# Patient Record
Sex: FEMALE | Race: WHITE | NOT HISPANIC OR LATINO | Employment: OTHER | URBAN - METROPOLITAN AREA
[De-identification: names, ages, dates, MRNs, and addresses within clinical notes are randomized per-mention and may not be internally consistent; named-entity substitution may affect disease eponyms.]

---

## 2023-08-07 LAB
ALANINE AMINOTRANSFERASE (SGPT) (U/L) IN SER/PLAS: 16 U/L (ref 7–45)
ALBUMIN (G/DL) IN SER/PLAS: 4.4 G/DL (ref 3.4–5)
ALKALINE PHOSPHATASE (U/L) IN SER/PLAS: 72 U/L (ref 33–136)
ANION GAP IN SER/PLAS: 10 MMOL/L (ref 10–20)
ASPARTATE AMINOTRANSFERASE (SGOT) (U/L) IN SER/PLAS: 14 U/L (ref 9–39)
BILIRUBIN TOTAL (MG/DL) IN SER/PLAS: 0.5 MG/DL (ref 0–1.2)
CALCIUM (MG/DL) IN SER/PLAS: 8.8 MG/DL (ref 8.6–10.3)
CARBON DIOXIDE, TOTAL (MMOL/L) IN SER/PLAS: 20 MMOL/L (ref 21–32)
CHLORIDE (MMOL/L) IN SER/PLAS: 113 MMOL/L (ref 98–107)
CHOLESTEROL (MG/DL) IN SER/PLAS: 194 MG/DL (ref 0–199)
CHOLESTEROL IN HDL (MG/DL) IN SER/PLAS: 43.5 MG/DL
CHOLESTEROL/HDL RATIO: 4.5
CREATININE (MG/DL) IN SER/PLAS: 0.93 MG/DL (ref 0.5–1.05)
ERYTHROCYTE DISTRIBUTION WIDTH (RATIO) BY AUTOMATED COUNT: 13.3 % (ref 11.5–14.5)
ERYTHROCYTE MEAN CORPUSCULAR HEMOGLOBIN CONCENTRATION (G/DL) BY AUTOMATED: 30.6 G/DL (ref 32–36)
ERYTHROCYTE MEAN CORPUSCULAR VOLUME (FL) BY AUTOMATED COUNT: 92 FL (ref 80–100)
ERYTHROCYTES (10*6/UL) IN BLOOD BY AUTOMATED COUNT: 4.77 X10E12/L (ref 4–5.2)
GFR FEMALE: 68 ML/MIN/1.73M2
GLUCOSE (MG/DL) IN SER/PLAS: 93 MG/DL (ref 74–99)
HEMATOCRIT (%) IN BLOOD BY AUTOMATED COUNT: 44.1 % (ref 36–46)
HEMOGLOBIN (G/DL) IN BLOOD: 13.5 G/DL (ref 12–16)
LDL: 136 MG/DL (ref 0–99)
LEUKOCYTES (10*3/UL) IN BLOOD BY AUTOMATED COUNT: 6.1 X10E9/L (ref 4.4–11.3)
NRBC (PER 100 WBCS) BY AUTOMATED COUNT: 0 /100 WBC (ref 0–0)
PLATELETS (10*3/UL) IN BLOOD AUTOMATED COUNT: 279 X10E9/L (ref 150–450)
POTASSIUM (MMOL/L) IN SER/PLAS: 4.3 MMOL/L (ref 3.5–5.3)
PROTEIN TOTAL: 7.4 G/DL (ref 6.4–8.2)
SODIUM (MMOL/L) IN SER/PLAS: 139 MMOL/L (ref 136–145)
TRIGLYCERIDE (MG/DL) IN SER/PLAS: 71 MG/DL (ref 0–149)
UREA NITROGEN (MG/DL) IN SER/PLAS: 17 MG/DL (ref 6–23)
VLDL: 14 MG/DL (ref 0–40)

## 2023-11-09 ENCOUNTER — HOSPITAL ENCOUNTER (OUTPATIENT)
Dept: RADIOLOGY | Facility: CLINIC | Age: 68
Discharge: HOME | End: 2023-11-09
Payer: MEDICARE

## 2023-11-09 DIAGNOSIS — R91.8 OTHER NONSPECIFIC ABNORMAL FINDING OF LUNG FIELD: ICD-10-CM

## 2023-11-09 PROCEDURE — 71250 CT THORAX DX C-: CPT | Performed by: RADIOLOGY

## 2023-11-09 PROCEDURE — 71250 CT THORAX DX C-: CPT

## 2023-11-12 PROBLEM — M17.11 OSTEOARTHRITIS OF RIGHT KNEE: Status: ACTIVE | Noted: 2023-11-12

## 2023-11-12 PROBLEM — I10 HTN (HYPERTENSION): Status: ACTIVE | Noted: 2023-11-12

## 2023-11-12 PROBLEM — K21.9 GERD (GASTROESOPHAGEAL REFLUX DISEASE): Status: ACTIVE | Noted: 2018-04-16

## 2023-11-12 PROBLEM — R06.09 EXERTIONAL DYSPNEA: Status: ACTIVE | Noted: 2023-11-12

## 2023-11-12 PROBLEM — R91.8 MULTIPLE LUNG NODULES ON CT: Status: ACTIVE | Noted: 2023-11-12

## 2023-11-12 PROBLEM — M50.90 CERVICAL NECK PAIN WITH EVIDENCE OF DISC DISEASE: Status: ACTIVE | Noted: 2023-11-12

## 2023-11-12 PROBLEM — M19.90 ARTHRITIS: Status: ACTIVE | Noted: 2023-11-12

## 2023-11-12 PROBLEM — M19.90 OA (OSTEOARTHRITIS): Status: ACTIVE | Noted: 2018-04-16

## 2023-11-12 PROBLEM — E66.9 OBESITY: Status: ACTIVE | Noted: 2023-11-12

## 2023-11-12 PROBLEM — R07.89 CHEST PAIN, ATYPICAL: Status: ACTIVE | Noted: 2023-11-12

## 2023-11-12 PROBLEM — M25.532 PAIN IN LEFT WRIST: Status: ACTIVE | Noted: 2017-05-23

## 2023-11-12 PROBLEM — Z86.2 HISTORY OF IMMUNE THROMBOCYTOPENIA: Status: ACTIVE | Noted: 2023-11-12

## 2023-11-12 PROBLEM — R94.31 ABNORMAL ELECTROCARDIOGRAPHY: Status: ACTIVE | Noted: 2023-11-12

## 2023-11-12 PROBLEM — I10 BENIGN ESSENTIAL HYPERTENSION: Status: ACTIVE | Noted: 2018-04-16

## 2023-11-12 PROBLEM — E78.5 HYPERLIPIDEMIA: Status: ACTIVE | Noted: 2023-11-12

## 2023-11-12 PROBLEM — M79.646 THUMB PAIN: Status: ACTIVE | Noted: 2017-05-23

## 2023-11-12 PROBLEM — R91.1 LUNG NODULE: Status: ACTIVE | Noted: 2023-11-12

## 2023-11-12 PROBLEM — Z96.659 S/P TOTAL KNEE REPLACEMENT: Status: ACTIVE | Noted: 2018-04-16

## 2023-11-12 PROBLEM — C53.9: Status: ACTIVE | Noted: 2023-11-12

## 2023-11-12 RX ORDER — FLUTICASONE PROPIONATE 0.5 MG/G
CREAM TOPICAL 2 TIMES DAILY
COMMUNITY
Start: 2023-05-15

## 2023-11-12 RX ORDER — AMMONIUM LACTATE 12 G/100G
LOTION TOPICAL DAILY
COMMUNITY
Start: 2022-11-14 | End: 2023-11-30

## 2023-11-12 RX ORDER — THYROID 15 MG/1
15 TABLET ORAL
COMMUNITY
Start: 2018-08-20 | End: 2023-11-30

## 2023-11-12 RX ORDER — THYROID 60 MG/1
1 TABLET ORAL DAILY
COMMUNITY
End: 2023-11-30

## 2023-11-12 RX ORDER — NYSTATIN 100000 [USP'U]/G
1 POWDER TOPICAL EVERY MORNING
COMMUNITY
Start: 2023-10-18 | End: 2023-11-30

## 2023-11-12 RX ORDER — THYROID 30 MG/1
75 TABLET ORAL
COMMUNITY
Start: 2018-03-27 | End: 2023-11-30

## 2023-11-12 RX ORDER — METRONIDAZOLE 7.5 MG/G
GEL TOPICAL DAILY
COMMUNITY
Start: 2022-11-14 | End: 2023-11-30

## 2023-11-12 RX ORDER — TELMISARTAN 80 MG/1
80 TABLET ORAL
COMMUNITY

## 2023-11-12 RX ORDER — CALCIUM CARBONATE/VITAMIN D3 600 MG-10
1 TABLET ORAL DAILY
COMMUNITY
End: 2023-11-30

## 2023-11-12 RX ORDER — ACETAZOLAMIDE 250 MG/1
250 TABLET ORAL 3 TIMES DAILY
COMMUNITY
End: 2023-11-30

## 2023-11-12 RX ORDER — ACETAZOLAMIDE 250 MG/1
375 TABLET ORAL 3 TIMES DAILY
COMMUNITY
Start: 2023-07-12

## 2023-11-12 RX ORDER — METRONIDAZOLE 10 MG/G
GEL TOPICAL DAILY
COMMUNITY
Start: 2023-05-15

## 2023-11-12 RX ORDER — TELMISARTAN 80 MG/1
40 TABLET ORAL DAILY
COMMUNITY
End: 2023-11-30

## 2023-11-12 RX ORDER — HYDROCORTISONE 25 MG/G
CREAM TOPICAL 2 TIMES DAILY
COMMUNITY
Start: 2021-06-17 | End: 2023-11-30

## 2023-11-12 RX ORDER — THYROID, PORCINE 90 MG/1
90 TABLET ORAL
COMMUNITY

## 2023-11-12 RX ORDER — DEXLANSOPRAZOLE 60 MG/1
60 CAPSULE, DELAYED RELEASE ORAL DAILY
COMMUNITY
Start: 2018-04-16 | End: 2023-11-30

## 2023-11-12 RX ORDER — ASPIRIN 325 MG
325 TABLET, DELAYED RELEASE (ENTERIC COATED) ORAL
COMMUNITY
Start: 2018-04-17 | End: 2023-11-30

## 2023-11-12 RX ORDER — FUROSEMIDE 40 MG/1
60 TABLET ORAL DAILY PRN
COMMUNITY
Start: 2020-09-02 | End: 2023-11-30

## 2023-11-14 ENCOUNTER — APPOINTMENT (OUTPATIENT)
Dept: PRIMARY CARE | Facility: CLINIC | Age: 68
End: 2023-11-14
Payer: MEDICARE

## 2023-11-28 ENCOUNTER — OFFICE VISIT (OUTPATIENT)
Dept: PRIMARY CARE | Facility: CLINIC | Age: 68
End: 2023-11-28
Payer: MEDICARE

## 2023-11-28 VITALS — HEIGHT: 68 IN | BODY MASS INDEX: 28.74 KG/M2

## 2023-11-28 DIAGNOSIS — R91.8 LUNG NODULE, MULTIPLE: Primary | ICD-10-CM

## 2023-11-28 PROCEDURE — 99212 OFFICE O/P EST SF 10 MIN: CPT | Mod: ZK | Performed by: NURSE PRACTITIONER

## 2023-11-28 PROCEDURE — 1159F MED LIST DOCD IN RCRD: CPT | Performed by: NURSE PRACTITIONER

## 2023-11-28 PROCEDURE — 99213 OFFICE O/P EST LOW 20 MIN: CPT | Performed by: NURSE PRACTITIONER

## 2023-11-28 ASSESSMENT — PATIENT HEALTH QUESTIONNAIRE - PHQ9
1. LITTLE INTEREST OR PLEASURE IN DOING THINGS: NOT AT ALL
SUM OF ALL RESPONSES TO PHQ9 QUESTIONS 1 AND 2: 0
2. FEELING DOWN, DEPRESSED OR HOPELESS: NOT AT ALL

## 2023-11-28 ASSESSMENT — COLUMBIA-SUICIDE SEVERITY RATING SCALE - C-SSRS
2. HAVE YOU ACTUALLY HAD ANY THOUGHTS OF KILLING YOURSELF?: NO
1. IN THE PAST MONTH, HAVE YOU WISHED YOU WERE DEAD OR WISHED YOU COULD GO TO SLEEP AND NOT WAKE UP?: NO
6. HAVE YOU EVER DONE ANYTHING, STARTED TO DO ANYTHING, OR PREPARED TO DO ANYTHING TO END YOUR LIFE?: NO

## 2023-11-28 ASSESSMENT — ENCOUNTER SYMPTOMS
OCCASIONAL FEELINGS OF UNSTEADINESS: 0
LOSS OF SENSATION IN FEET: 0
DEPRESSION: 0

## 2023-11-28 NOTE — PATIENT INSTRUCTIONS
Lung nodule surveillance  2 micro nodules considered benign. Otherwise resolution of additional previous nodules as described.  Area of subpleural reticulation at the lingula, probably inflammatory such as bronchiolitis.  Recommend CT Chest in 6 months.   She will be notified of results as they become available.

## 2023-11-28 NOTE — PROGRESS NOTES
"Subjective   Patient ID: Apryl Kim \"Su" is a 68 y.o. female who presents for lung nodule clinic   HPI 68-year-old female presents today for lung nodule clinic.    Former smoker.  Quit in 1995. Smoked for 10 years one pack per week. Mother and father both with skin cancer.    History of adenocarcinoma of the cervix     CT chest dated 11/9/2023   A micronodule in the left lung on image 152 of series 5 and right  lung on image 155 are stable and considered benign. Additional  nodularity as described on the previous examination have resolved,  indicating that were probably inflammatory including intrapulmonary  nodes. There is an approximate 3 cm area of subpleural reticulation  at the lingula laterally not evident on the prior examination. This  is probably inflammatory such as bronchiolitis or possibly due to  interval fibrosis. The lungs otherwise are clear.    CT chest dated 11/2/2022 with comparison to 11/16/2021  There is a 2 mm nodule in the left upper lobe in Image 133. there is  a 2 mm nodule in the left upper lobe along the fissure in image 159.  There is a 2 mm nodule in the left lower lobe in image 169.     There is a focal ground-glass opacity in the medial aspect of the  right lower lobe best seen on image 229. A 3 mm nodule is identified  in the right lower lobe in image 118. A few 1-2 mm nodules are  identified in the right lower lobe as well such as on image 176 and  181. There is a 1-2 mm nodule in the right middle lobe in image 160  corresponding to previously seen nodule measuring 4 mm which has  decreased in size.     CT cardiac score dated 11/16/2021  An approximate 4 mm right lung nodule on image 31 of series 203 is  probably an intrapulmonary node, perhaps granuloma.     Review of Systems  Review of systems: Present-feeling well. Not present-chills, fatigue and fever.  Respiratory: Not present-difficulty breathing, cough, bloody sputum.  Cardiovascular: Not present-chest pain, " palpitations, dyspnea on exertion.  Objective   There were no vitals taken for this visit.     Physical Exam  Gen.: Mental status-alert. Gen. appearance-cooperative, well groomed and consistent with stated age. Not in acute distress or sickly. Orientation-oriented to time, place, purpose and person. Build and nutrition-well-nourished and well-developed. Hydration-well-hydrated.    Integumentary: Color-normal coloration skin. Skin moisture-normal skin moisture.    Chest and lung exam: Auscultation-normal breath sounds, no adventitious lung sounds and normal vocal resonance. Chest wall is normal in shape and non-tender.    Cardiovascular: Auscultation: Regular rate and rhythm. Heart sounds-normal heart sounds, S1-S2. No murmurs or gallops appreciated. Carotid arteries normal and without bruit.  Assessment/Plan   Diagnoses and all orders for this visit:  Lung nodule, multiple  -     CT chest wo IV contrast; Future

## 2023-12-15 ENCOUNTER — APPOINTMENT (OUTPATIENT)
Dept: RADIOLOGY | Facility: CLINIC | Age: 68
End: 2023-12-15
Payer: MEDICARE

## 2023-12-20 ENCOUNTER — HOSPITAL ENCOUNTER (OUTPATIENT)
Dept: RADIOLOGY | Facility: HOSPITAL | Age: 68
Discharge: HOME | End: 2023-12-20
Payer: MEDICARE

## 2023-12-20 DIAGNOSIS — M25.562 PAIN IN LEFT KNEE: ICD-10-CM

## 2023-12-20 PROCEDURE — 73562 X-RAY EXAM OF KNEE 3: CPT | Mod: LT

## 2023-12-20 PROCEDURE — 73562 X-RAY EXAM OF KNEE 3: CPT | Mod: LEFT SIDE | Performed by: RADIOLOGY

## 2023-12-28 ENCOUNTER — APPOINTMENT (OUTPATIENT)
Dept: RADIOLOGY | Facility: CLINIC | Age: 68
End: 2023-12-28
Payer: MEDICARE

## 2024-05-29 ENCOUNTER — HOSPITAL ENCOUNTER (OUTPATIENT)
Dept: RADIOLOGY | Facility: CLINIC | Age: 69
Discharge: HOME | End: 2024-05-29
Payer: MEDICARE

## 2024-05-29 DIAGNOSIS — R91.8 LUNG NODULE, MULTIPLE: ICD-10-CM

## 2024-05-29 PROCEDURE — 71250 CT THORAX DX C-: CPT

## 2024-05-29 PROCEDURE — 71250 CT THORAX DX C-: CPT | Performed by: RADIOLOGY

## 2024-06-04 ENCOUNTER — OFFICE VISIT (OUTPATIENT)
Dept: PRIMARY CARE | Facility: CLINIC | Age: 69
End: 2024-06-04
Payer: MEDICARE

## 2024-06-04 VITALS
TEMPERATURE: 98.3 F | HEIGHT: 67 IN | HEART RATE: 67 BPM | SYSTOLIC BLOOD PRESSURE: 132 MMHG | DIASTOLIC BLOOD PRESSURE: 70 MMHG | BODY MASS INDEX: 29.6 KG/M2

## 2024-06-04 DIAGNOSIS — R91.8 LUNG NODULE, MULTIPLE: Primary | ICD-10-CM

## 2024-06-04 PROCEDURE — 99212 OFFICE O/P EST SF 10 MIN: CPT | Performed by: NURSE PRACTITIONER

## 2024-06-04 SDOH — ECONOMIC STABILITY: FOOD INSECURITY: WITHIN THE PAST 12 MONTHS, THE FOOD YOU BOUGHT JUST DIDN'T LAST AND YOU DIDN'T HAVE MONEY TO GET MORE.: PATIENT DECLINED

## 2024-06-04 SDOH — ECONOMIC STABILITY: FOOD INSECURITY: WITHIN THE PAST 12 MONTHS, YOU WORRIED THAT YOUR FOOD WOULD RUN OUT BEFORE YOU GOT MONEY TO BUY MORE.: PATIENT DECLINED

## 2024-06-04 ASSESSMENT — LIFESTYLE VARIABLES
AUDIT-C TOTAL SCORE: -1
HOW MANY STANDARD DRINKS CONTAINING ALCOHOL DO YOU HAVE ON A TYPICAL DAY: PATIENT DECLINED
SKIP TO QUESTIONS 9-10: 0
HOW OFTEN DO YOU HAVE SIX OR MORE DRINKS ON ONE OCCASION: PATIENT DECLINED
HOW OFTEN DO YOU HAVE A DRINK CONTAINING ALCOHOL: PATIENT DECLINED

## 2024-06-04 ASSESSMENT — PAIN SCALES - GENERAL: PAINLEVEL: 0-NO PAIN

## 2024-06-04 ASSESSMENT — SOCIAL DETERMINANTS OF HEALTH (SDOH): HOW HARD IS IT FOR YOU TO PAY FOR THE VERY BASICS LIKE FOOD, HOUSING, MEDICAL CARE, AND HEATING?: PATIENT DECLINED

## 2024-06-04 NOTE — PATIENT INSTRUCTIONS
No suspicious nodules or infiltrates.  No further action required at this time per Fleischner guidelines.

## 2024-06-04 NOTE — PROGRESS NOTES
"Subjective   Patient ID: Apryl Kim \"Su" is a 69 y.o. female who presents for Lung Nodule CT follow.  HPI 72-year-old female presents today for lung nodule clinic.    Former smoker.  Quit in 1991. Smoked for 10 years one pack per week. Mother and father both with skin cancer.    History of adenocarcinoma of the cervix     CT Chest dated 5/29/2024  Lungs are hyperinflated. No suspicious lung nodules or infiltrates..      CT chest dated 11/9/2023   A micronodule in the left lung on image 152 of series 5 and right  lung on image 155 are stable and considered benign. Additional  nodularity as described on the previous examination have resolved,  indicating that were probably inflammatory including intrapulmonary  nodes. There is an approximate 3 cm area of subpleural reticulation  at the lingula laterally not evident on the prior examination. This  is probably inflammatory such as bronchiolitis or possibly due to  interval fibrosis. The lungs otherwise are clear.     CT chest dated 11/2/2022 with comparison to 11/16/2021  There is a 2 mm nodule in the left upper lobe in Image 133. there is  a 2 mm nodule in the left upper lobe along the fissure in image 159.  There is a 2 mm nodule in the left lower lobe in image 169.     There is a focal ground-glass opacity in the medial aspect of the  right lower lobe best seen on image 229. A 3 mm nodule is identified  in the right lower lobe in image 118. A few 1-2 mm nodules are  identified in the right lower lobe as well such as on image 176 and  181. There is a 1-2 mm nodule in the right middle lobe in image 160  corresponding to previously seen nodule measuring 4 mm which has  decreased in size.     CT cardiac score dated 11/16/2021  An approximate 4 mm right lung nodule on image 31 of series 203 is  probably an intrapulmonary node, perhaps granuloma.     Review of Systems  Review of systems: Present-feeling well. Not present-chills, fatigue and fever.  Respiratory: " "Not present-difficulty breathing, cough, bloody sputum.  Cardiovascular: Not present-chest pain, palpitations, dyspnea on exertion.  Objective   /70 (BP Location: Left arm)   Pulse 67   Temp 36.8 °C (98.3 °F) (Temporal)   Ht 1.702 m (5' 7\")   BMI 29.60 kg/m²      Physical Exam  Gen.: Mental status-alert. Gen. appearance-cooperative, well groomed and consistent with stated age. Not in acute distress or sickly. Orientation-oriented to time, place, purpose and person. Build and nutrition-well-nourished and well-developed. Hydration-well-hydrated.    Integumentary: General characteristics-overall examination the patient´s skin reveals-no suspicious lesions, no bruises and no evidence of scars. Color-normal coloration skin. Skin moisture-normal skin moisture.    Chest and lung exam: Auscultation-normal breath sounds, no adventitious lung sounds and normal vocal resonance. Chest wall is normal in shape and non-tender.    Cardiovascular: Auscultation: Regular rate and rhythm. Heart sounds-normal heart sounds, S1-S2. No murmurs or gallops appreciated. Carotid arteries normal and without bruit.  Assessment/Plan           "

## 2024-07-02 DIAGNOSIS — I10 BENIGN ESSENTIAL HYPERTENSION: ICD-10-CM

## 2024-07-02 PROBLEM — Z86.2 HISTORY OF ANEMIA: Status: ACTIVE | Noted: 2024-07-02

## 2024-07-02 PROBLEM — R23.2 FLUSHING: Status: ACTIVE | Noted: 2024-07-02

## 2024-07-02 PROBLEM — M25.50 ARTHRALGIA: Status: ACTIVE | Noted: 2024-07-02

## 2024-07-02 PROBLEM — N39.3 STRESS INCONTINENCE OF URINE: Status: ACTIVE | Noted: 2024-07-02

## 2024-07-02 PROBLEM — C53.9: Status: RESOLVED | Noted: 2023-11-12 | Resolved: 2024-07-02

## 2024-07-02 PROBLEM — R39.15 URINARY URGENCY: Status: ACTIVE | Noted: 2024-07-02

## 2024-07-22 ENCOUNTER — APPOINTMENT (OUTPATIENT)
Dept: CARDIOLOGY | Facility: CLINIC | Age: 69
End: 2024-07-22
Payer: MEDICARE

## 2024-07-30 ENCOUNTER — APPOINTMENT (OUTPATIENT)
Dept: RADIOLOGY | Facility: CLINIC | Age: 69
End: 2024-07-30
Payer: MEDICARE

## 2024-08-14 ENCOUNTER — APPOINTMENT (OUTPATIENT)
Dept: CARDIOLOGY | Facility: CLINIC | Age: 69
End: 2024-08-14
Payer: MEDICARE

## 2024-08-19 ENCOUNTER — HOSPITAL ENCOUNTER (OUTPATIENT)
Dept: RADIOLOGY | Facility: CLINIC | Age: 69
Discharge: HOME | End: 2024-08-19
Payer: MEDICARE

## 2024-08-19 VITALS — BODY MASS INDEX: 39.4 KG/M2 | WEIGHT: 260 LBS | HEIGHT: 68 IN

## 2024-08-19 DIAGNOSIS — Z12.31 ENCOUNTER FOR SCREENING MAMMOGRAM FOR MALIGNANT NEOPLASM OF BREAST: ICD-10-CM

## 2024-08-19 PROCEDURE — 77063 BREAST TOMOSYNTHESIS BI: CPT | Performed by: RADIOLOGY

## 2024-08-19 PROCEDURE — 77067 SCR MAMMO BI INCL CAD: CPT | Performed by: RADIOLOGY

## 2024-08-19 PROCEDURE — 77067 SCR MAMMO BI INCL CAD: CPT

## 2024-08-29 ENCOUNTER — LAB (OUTPATIENT)
Dept: LAB | Facility: LAB | Age: 69
End: 2024-08-29
Payer: MEDICARE

## 2024-08-29 DIAGNOSIS — I10 BENIGN ESSENTIAL HYPERTENSION: ICD-10-CM

## 2024-08-29 LAB
ALBUMIN SERPL BCP-MCNC: 4.3 G/DL (ref 3.4–5)
ALP SERPL-CCNC: 75 U/L (ref 33–136)
ALT SERPL W P-5'-P-CCNC: 16 U/L (ref 7–45)
ANION GAP SERPL CALC-SCNC: 9 MMOL/L (ref 10–20)
AST SERPL W P-5'-P-CCNC: 14 U/L (ref 9–39)
BILIRUB SERPL-MCNC: 0.7 MG/DL (ref 0–1.2)
BUN SERPL-MCNC: 15 MG/DL (ref 6–23)
CALCIUM SERPL-MCNC: 8.9 MG/DL (ref 8.6–10.3)
CHLORIDE SERPL-SCNC: 112 MMOL/L (ref 98–107)
CHOLEST SERPL-MCNC: 216 MG/DL (ref 0–199)
CHOLESTEROL/HDL RATIO: 4
CO2 SERPL-SCNC: 23 MMOL/L (ref 21–32)
CREAT SERPL-MCNC: 0.91 MG/DL (ref 0.5–1.05)
EGFRCR SERPLBLD CKD-EPI 2021: 69 ML/MIN/1.73M*2
ERYTHROCYTE [DISTWIDTH] IN BLOOD BY AUTOMATED COUNT: 13.7 % (ref 11.5–14.5)
GLUCOSE SERPL-MCNC: 104 MG/DL (ref 74–99)
HCT VFR BLD AUTO: 43.3 % (ref 36–46)
HDLC SERPL-MCNC: 53.7 MG/DL
HGB BLD-MCNC: 13.5 G/DL (ref 12–16)
LDLC SERPL CALC-MCNC: 148 MG/DL
MCH RBC QN AUTO: 28.8 PG (ref 26–34)
MCHC RBC AUTO-ENTMCNC: 31.2 G/DL (ref 32–36)
MCV RBC AUTO: 92 FL (ref 80–100)
NON HDL CHOLESTEROL: 162 MG/DL (ref 0–149)
NRBC BLD-RTO: 0 /100 WBCS (ref 0–0)
PLATELET # BLD AUTO: 284 X10*3/UL (ref 150–450)
POTASSIUM SERPL-SCNC: 4.7 MMOL/L (ref 3.5–5.3)
PROT SERPL-MCNC: 7 G/DL (ref 6.4–8.2)
RBC # BLD AUTO: 4.69 X10*6/UL (ref 4–5.2)
SODIUM SERPL-SCNC: 139 MMOL/L (ref 136–145)
TRIGL SERPL-MCNC: 71 MG/DL (ref 0–149)
VLDL: 14 MG/DL (ref 0–40)
WBC # BLD AUTO: 5.8 X10*3/UL (ref 4.4–11.3)

## 2024-08-29 PROCEDURE — 80053 COMPREHEN METABOLIC PANEL: CPT

## 2024-08-29 PROCEDURE — 36415 COLL VENOUS BLD VENIPUNCTURE: CPT

## 2024-08-29 PROCEDURE — 85027 COMPLETE CBC AUTOMATED: CPT

## 2024-08-29 PROCEDURE — 80061 LIPID PANEL: CPT

## 2024-09-04 ENCOUNTER — APPOINTMENT (OUTPATIENT)
Dept: CARDIOLOGY | Facility: CLINIC | Age: 69
End: 2024-09-04
Payer: MEDICARE

## 2024-09-04 VITALS
HEART RATE: 60 BPM | HEIGHT: 68 IN | SYSTOLIC BLOOD PRESSURE: 140 MMHG | OXYGEN SATURATION: 97 % | BODY MASS INDEX: 39.53 KG/M2 | DIASTOLIC BLOOD PRESSURE: 66 MMHG

## 2024-09-04 DIAGNOSIS — I10 BENIGN ESSENTIAL HYPERTENSION: ICD-10-CM

## 2024-09-04 DIAGNOSIS — K21.9 GASTROESOPHAGEAL REFLUX DISEASE WITHOUT ESOPHAGITIS: ICD-10-CM

## 2024-09-04 DIAGNOSIS — I10 BENIGN ESSENTIAL HYPERTENSION: Primary | ICD-10-CM

## 2024-09-04 DIAGNOSIS — R94.31 ABNORMAL ELECTROCARDIOGRAPHY: ICD-10-CM

## 2024-09-04 DIAGNOSIS — E78.00 PURE HYPERCHOLESTEROLEMIA: ICD-10-CM

## 2024-09-04 DIAGNOSIS — E66.9 CLASS 2 OBESITY WITHOUT SERIOUS COMORBIDITY WITH BODY MASS INDEX (BMI) OF 39.0 TO 39.9 IN ADULT, UNSPECIFIED OBESITY TYPE: ICD-10-CM

## 2024-09-04 DIAGNOSIS — Z96.659 STATUS POST TOTAL KNEE REPLACEMENT, UNSPECIFIED LATERALITY: ICD-10-CM

## 2024-09-04 DIAGNOSIS — E03.9 HYPOTHYROIDISM, UNSPECIFIED TYPE: ICD-10-CM

## 2024-09-04 PROCEDURE — 1159F MED LIST DOCD IN RCRD: CPT | Performed by: INTERNAL MEDICINE

## 2024-09-04 PROCEDURE — 1036F TOBACCO NON-USER: CPT | Performed by: INTERNAL MEDICINE

## 2024-09-04 PROCEDURE — 93010 ELECTROCARDIOGRAM REPORT: CPT | Performed by: INTERNAL MEDICINE

## 2024-09-04 PROCEDURE — 3078F DIAST BP <80 MM HG: CPT | Performed by: INTERNAL MEDICINE

## 2024-09-04 PROCEDURE — 3077F SYST BP >= 140 MM HG: CPT | Performed by: INTERNAL MEDICINE

## 2024-09-04 PROCEDURE — 99213 OFFICE O/P EST LOW 20 MIN: CPT | Performed by: INTERNAL MEDICINE

## 2024-09-04 PROCEDURE — 93005 ELECTROCARDIOGRAM TRACING: CPT | Performed by: INTERNAL MEDICINE

## 2024-09-04 PROCEDURE — 1160F RVW MEDS BY RX/DR IN RCRD: CPT | Performed by: INTERNAL MEDICINE

## 2024-09-04 RX ORDER — FUROSEMIDE 40 MG/1
TABLET ORAL
COMMUNITY
Start: 2024-07-01 | End: 2024-09-04 | Stop reason: SDUPTHER

## 2024-09-04 RX ORDER — ACETAZOLAMIDE 250 MG/1
375 TABLET ORAL 3 TIMES DAILY
Qty: 405 TABLET | Refills: 3 | Status: SHIPPED | OUTPATIENT
Start: 2024-09-04

## 2024-09-04 RX ORDER — FUROSEMIDE 40 MG/1
60 TABLET ORAL AS NEEDED
Qty: 90 TABLET | Refills: 2 | Status: SHIPPED | OUTPATIENT
Start: 2024-09-04

## 2024-09-04 NOTE — ASSESSMENT & PLAN NOTE
6/1/16 exercise nuclear stress tst neg. For ischemia TO 11.7 mets WITH lvef = 67%.  Refused Lexiscan 8/15/23 visixt

## 2024-09-04 NOTE — ASSESSMENT & PLAN NOTE
8/29/24 Tchol = 216, HDL = 53, LDL = 148, Trig = 71.  Followed by Dr. Margaret Andres.  Coronary score = 0 11/16/21

## 2024-09-04 NOTE — PROGRESS NOTES
"  Laura Kim \"Abbie\"  is a 69 y.o. year old female who presents for dyspnea which she notes only with exercise, no chest pain, no palpitations, no edema.  Lung nodules have disappeared    Blood pressure 140/66, pulse 60, height 1.727 m (5' 8\"), SpO2 97%.   Codeine, Lisinopril, and Adhesive tape-silicones  Past Medical History:   Diagnosis Date    Benign intracranial hypertension     Pseudotumor cerebri    Cervicalgia     Neck pain    Flushing     Hot flashes    Other symptoms and signs involving the musculoskeletal system     Limb weakness    Pain in unspecified joint     Joint pain    Pain in unspecified limb     Limb pain    Personal history of diseases of the blood and blood-forming organs and certain disorders involving the immune mechanism     History of anemia    Personal history of other diseases of the circulatory system     History of cardiac murmur    Personal history of other diseases of the circulatory system     History of varicose veins of lower extremity    Personal history of other diseases of the musculoskeletal system and connective tissue     History of backache    Personal history of other endocrine, nutritional and metabolic disease     History of hypothyroidism    Personal history of other infectious and parasitic diseases     History of chickenpox    Personal history of other specified conditions     History of dizziness    Personal history of other specified conditions     History of palpitations    Personal history of other specified conditions     History of chest pain    Spondylosis without myelopathy or radiculopathy, cervical region     Cervical spondylosis    Stress incontinence (female) (male)     Stress incontinence    Urgency of urination     Urinary urgency     Past Surgical History:   Procedure Laterality Date    HYSTERECTOMY  01/04/2019    Hysterectomy    KNEE SURGERY  01/04/2019    Knee Surgery    OTHER SURGICAL HISTORY  01/04/2019    Neuroplasty Left Thumb    " OTHER SURGICAL HISTORY  01/04/2019    Breast reduction    ROTATOR CUFF REPAIR  01/04/2019    Rotator Cuff Repair    TONSILLECTOMY  09/02/2014    Tonsillectomy     Family History   Problem Relation Name Age of Onset    Anemia Mother      Atrial fibrillation Mother      Blood Disorder Mother      Hypertension Mother      Skin cancer Mother      Thyroid disease Mother      Stroke Father      Hypertension Father      Kidney disease Father      Melanoma Father      Melanoma Brother       @SOC    Current Outpatient Medications   Medication Sig Dispense Refill    acetaZOLAMIDE (Diamox) 250 mg tablet Take 1.5 tablets (375 mg) by mouth 3 times a day.      [place] Thyroid 90 mg tablet Take 1 tablet (90 mg) by mouth once daily in the morning. Take before meals. TAKE ON AN EMPTY STOMACH      fluticasone (Cutivate) 0.05 % cream Apply topically 2 times a day.  APPLY TO AFFECTED AREAS AROUND EARS TWICE A DAY FOR 2 WEEKS PER MONTH. REPEAT AS NEEDED.      metroNIDAZOLE (Metrogel) 1 % gel Apply topically once daily.  APPLY TO AFFECTED AREAS TOPICALLY ONCE A DAY      telmisartan (MIcarDIS) 80 mg tablet Take 1 tablet (80 mg) by mouth once daily.       No current facility-administered medications for this visit.        ROS  Review of Systems   All other systems reviewed and are negative.      Physical Exam  Physical Exam  Constitutional:       Appearance: She is obese.   HENT:      Head: Normocephalic and atraumatic.   Cardiovascular:      Heart sounds: S1 normal and S2 normal. Murmur heard.      Crescendo systolic murmur is present with a grade of 1/6.   Pulmonary:      Effort: Pulmonary effort is normal.      Breath sounds: Normal breath sounds.   Abdominal:      Palpations: Abdomen is soft.   Musculoskeletal:      Right lower leg: No edema.      Left lower leg: No edema.   Skin:     General: Skin is warm and dry.   Neurological:      General: No focal deficit present.      Mental Status: She is alert and oriented to person, place, and  time.   Psychiatric:         Mood and Affect: Mood normal.         Behavior: Behavior normal.          EKG  Encounter Date: 09/04/24   ECG 12 Lead    Narrative    Sinus bradycardia at 57/min., otherwise WNL       Problem List Items Addressed This Visit       Abnormal electrocardiography     6/1/16 exercise nuclear stress tst neg. For ischemia TO 11.7 mets WITH lvef = 67%.  Refused Lexiscan 8/15/23 visixt         Benign essential hypertension - Primary    Relevant Orders    ECG 12 Lead (Completed)    GERD (gastroesophageal reflux disease)    Hyperlipidemia     8/29/24 Tchol = 216, HDL = 53, LDL = 148, Trig = 71.  Followed by Dr. Margaret Andres.  Coronary score = 0 11/16/21         Hypothyroidism    Obesity    S/P total knee replacement         Same meds with return 1 year with EKG      Cheo Armenta MD

## 2024-09-05 DIAGNOSIS — I10 BENIGN ESSENTIAL HYPERTENSION: ICD-10-CM

## 2024-09-05 RX ORDER — TELMISARTAN 80 MG/1
80 TABLET ORAL
Qty: 90 TABLET | Refills: 3 | Status: SHIPPED | OUTPATIENT
Start: 2024-09-05

## 2024-09-17 RX ORDER — FUROSEMIDE 40 MG/1
60 TABLET ORAL DAILY PRN
COMMUNITY
Start: 2024-09-17

## 2024-09-30 DIAGNOSIS — I10 BENIGN ESSENTIAL HYPERTENSION: ICD-10-CM

## 2024-10-01 DIAGNOSIS — I10 BENIGN ESSENTIAL HYPERTENSION: ICD-10-CM

## 2024-10-01 RX ORDER — FUROSEMIDE 40 MG/1
TABLET ORAL
Qty: 90 TABLET | Refills: 2 | Status: SHIPPED | OUTPATIENT
Start: 2024-10-01 | End: 2024-10-02 | Stop reason: SDUPTHER

## 2024-10-02 DIAGNOSIS — I10 BENIGN ESSENTIAL HYPERTENSION: Primary | ICD-10-CM

## 2024-10-02 RX ORDER — FUROSEMIDE 40 MG/1
TABLET ORAL
Qty: 90 TABLET | Refills: 2 | Status: SHIPPED | OUTPATIENT
Start: 2024-10-02

## 2024-10-11 RX ORDER — FUROSEMIDE 40 MG/1
60 TABLET ORAL DAILY
Qty: 45 TABLET | Refills: 2 | Status: SHIPPED | OUTPATIENT
Start: 2024-10-11

## 2025-06-30 ENCOUNTER — TELEPHONE (OUTPATIENT)
Dept: CARDIOLOGY | Facility: CLINIC | Age: 70
End: 2025-06-30
Payer: MEDICARE

## 2025-06-30 DIAGNOSIS — E78.00 PURE HYPERCHOLESTEROLEMIA: ICD-10-CM

## 2025-06-30 DIAGNOSIS — I10 BENIGN ESSENTIAL HYPERTENSION: ICD-10-CM

## 2025-06-30 DIAGNOSIS — E03.9 HYPOTHYROIDISM, UNSPECIFIED TYPE: ICD-10-CM

## 2025-06-30 NOTE — TELEPHONE ENCOUNTER
Pt called, states she has been having low blood pressures - 90s/50s, 80s/50s, along with fluctuating HR. Reports feeling sluggish and dizziness at times. Denies chest pain. Pt has appt scheduled for 7/3/25 and wanted to make Dr. Armenta aware that she has been holding her telmisartan. Patient has lasix 60mg daily on medlist - patient states she does not take. Patient asking if she can have blood work prior to OV? Last drawn 8/2024. Made Dr. Armenta aware of symptoms/medications. Per Dr. Armenta - Ok to order CMP, CBC, lipid, & TSH. Spoke to pt and made aware. Instructed to contact office with any further questions/concerns. Discussed ER if symptoms worsen.

## 2025-07-01 PROBLEM — E66.812 CLASS 2 OBESITY WITH BODY MASS INDEX (BMI) OF 39.0 TO 39.9 IN ADULT: Status: ACTIVE | Noted: 2023-11-12

## 2025-07-02 LAB
ALBUMIN SERPL-MCNC: 4.4 G/DL (ref 3.6–5.1)
ALP SERPL-CCNC: 71 U/L (ref 37–153)
ALT SERPL-CCNC: 14 U/L (ref 6–29)
ANION GAP SERPL CALCULATED.4IONS-SCNC: 8 MMOL/L (CALC) (ref 7–17)
AST SERPL-CCNC: 15 U/L (ref 10–35)
BILIRUB SERPL-MCNC: 0.5 MG/DL (ref 0.2–1.2)
BUN SERPL-MCNC: 18 MG/DL (ref 7–25)
CALCIUM SERPL-MCNC: 9.3 MG/DL (ref 8.6–10.4)
CHLORIDE SERPL-SCNC: 112 MMOL/L (ref 98–110)
CHOLEST SERPL-MCNC: 211 MG/DL
CHOLEST/HDLC SERPL: 4.2 (CALC)
CO2 SERPL-SCNC: 20 MMOL/L (ref 20–32)
CREAT SERPL-MCNC: 0.9 MG/DL (ref 0.5–1.05)
EGFRCR SERPLBLD CKD-EPI 2021: 70 ML/MIN/1.73M2
ERYTHROCYTE [DISTWIDTH] IN BLOOD BY AUTOMATED COUNT: 13.1 % (ref 11–15)
GLUCOSE SERPL-MCNC: 89 MG/DL (ref 65–99)
HCT VFR BLD AUTO: 41.7 % (ref 35–45)
HDLC SERPL-MCNC: 50 MG/DL
HGB BLD-MCNC: 12.8 G/DL (ref 11.7–15.5)
LDLC SERPL CALC-MCNC: 146 MG/DL (CALC)
MCH RBC QN AUTO: 28.4 PG (ref 27–33)
MCHC RBC AUTO-ENTMCNC: 30.7 G/DL (ref 32–36)
MCV RBC AUTO: 92.5 FL (ref 80–100)
NONHDLC SERPL-MCNC: 161 MG/DL (CALC)
PLATELET # BLD AUTO: 245 THOUSAND/UL (ref 140–400)
PMV BLD REES-ECKER: 10.9 FL (ref 7.5–12.5)
POTASSIUM SERPL-SCNC: 4.3 MMOL/L (ref 3.5–5.3)
PROT SERPL-MCNC: 6.8 G/DL (ref 6.1–8.1)
RBC # BLD AUTO: 4.51 MILLION/UL (ref 3.8–5.1)
SODIUM SERPL-SCNC: 140 MMOL/L (ref 135–146)
T4 FREE SERPL-MCNC: 1 NG/DL (ref 0.8–1.8)
TRIGL SERPL-MCNC: 58 MG/DL
TSH SERPL-ACNC: 0.16 MIU/L (ref 0.4–4.5)
WBC # BLD AUTO: 6.2 THOUSAND/UL (ref 3.8–10.8)

## 2025-07-03 ENCOUNTER — OFFICE VISIT (OUTPATIENT)
Dept: CARDIOLOGY | Facility: CLINIC | Age: 70
End: 2025-07-03
Payer: MEDICARE

## 2025-07-03 VITALS
HEART RATE: 61 BPM | WEIGHT: 196 LBS | SYSTOLIC BLOOD PRESSURE: 108 MMHG | HEIGHT: 68 IN | DIASTOLIC BLOOD PRESSURE: 72 MMHG | BODY MASS INDEX: 29.7 KG/M2 | OXYGEN SATURATION: 100 %

## 2025-07-03 DIAGNOSIS — G93.2 PSEUDOTUMOR CEREBRI: ICD-10-CM

## 2025-07-03 DIAGNOSIS — K21.9 GASTROESOPHAGEAL REFLUX DISEASE WITHOUT ESOPHAGITIS: ICD-10-CM

## 2025-07-03 DIAGNOSIS — I95.9 HYPOTENSION, UNSPECIFIED HYPOTENSION TYPE: Primary | ICD-10-CM

## 2025-07-03 DIAGNOSIS — E78.00 PURE HYPERCHOLESTEROLEMIA: ICD-10-CM

## 2025-07-03 DIAGNOSIS — Z86.2 HISTORY OF IMMUNE THROMBOCYTOPENIA: ICD-10-CM

## 2025-07-03 DIAGNOSIS — Z96.659 STATUS POST TOTAL KNEE REPLACEMENT, UNSPECIFIED LATERALITY: ICD-10-CM

## 2025-07-03 DIAGNOSIS — I10 BENIGN ESSENTIAL HYPERTENSION: ICD-10-CM

## 2025-07-03 LAB
ATRIAL RATE: 60 BPM
P AXIS: 64 DEGREES
P OFFSET: 192 MS
P ONSET: 133 MS
PR INTERVAL: 150 MS
Q ONSET: 208 MS
QRS COUNT: 9 BEATS
QRS DURATION: 92 MS
QT INTERVAL: 456 MS
QTC CALCULATION(BAZETT): 456 MS
QTC FREDERICIA: 456 MS
R AXIS: -32 DEGREES
T AXIS: 30 DEGREES
T OFFSET: 436 MS
VENTRICULAR RATE: 60 BPM

## 2025-07-03 PROCEDURE — 3074F SYST BP LT 130 MM HG: CPT | Performed by: INTERNAL MEDICINE

## 2025-07-03 PROCEDURE — 93005 ELECTROCARDIOGRAM TRACING: CPT | Performed by: INTERNAL MEDICINE

## 2025-07-03 PROCEDURE — 3008F BODY MASS INDEX DOCD: CPT | Performed by: INTERNAL MEDICINE

## 2025-07-03 PROCEDURE — 3078F DIAST BP <80 MM HG: CPT | Performed by: INTERNAL MEDICINE

## 2025-07-03 PROCEDURE — 93010 ELECTROCARDIOGRAM REPORT: CPT | Performed by: INTERNAL MEDICINE

## 2025-07-03 PROCEDURE — 99215 OFFICE O/P EST HI 40 MIN: CPT | Performed by: INTERNAL MEDICINE

## 2025-07-03 PROCEDURE — 1036F TOBACCO NON-USER: CPT | Performed by: INTERNAL MEDICINE

## 2025-07-03 PROCEDURE — 99212 OFFICE O/P EST SF 10 MIN: CPT | Mod: 25

## 2025-07-03 PROCEDURE — 1160F RVW MEDS BY RX/DR IN RCRD: CPT | Performed by: INTERNAL MEDICINE

## 2025-07-03 PROCEDURE — 1159F MED LIST DOCD IN RCRD: CPT | Performed by: INTERNAL MEDICINE

## 2025-07-03 NOTE — PROGRESS NOTES
"  Laura Kim \"Abbie\"  is a 70 y.o. year old female who presents for low BP.  She called 6/30/25 noting low BP's in 90-80/50's along with fluctuating HR feeling sluggish and dizzy at times.  No taking Lasix and Telmsartan for the past 10 days or so.  , taking BP readings between 90 and 110.  She has lost 70 pounds with fasting eating organic.   Symptoms started 2 weeks with heavy heat and humidity.  Having new symptomes with burry vision.  HR lying 50 up to 100.  Feeling better than early in the week.  HR.  On [employer] for Pseudotumor cerebri which she has decreased    Blood pressure 110/58, pulse 61, height 1.727 m (5' 8\"), weight 88.9 kg (196 lb), SpO2 100%.   Codeine, Lisinopril, and Adhesive tape-silicones  Medical History[1]  Surgical History[2]  Family History[3]  @SOC    Current Medications[4]     ROS  Review of Systems   All other systems reviewed and are negative.      Physical Exam  Physical Exam  Constitutional:       Appearance: Normal appearance.   HENT:      Head: Normocephalic and atraumatic.   Pulmonary:      Effort: Pulmonary effort is normal.      Breath sounds: Normal breath sounds.   Abdominal:      Palpations: Abdomen is soft.   Musculoskeletal:      Right lower leg: No edema.      Left lower leg: No edema.   Skin:     General: Skin is warm and dry.   Neurological:      General: No focal deficit present.      Mental Status: She is alert and oriented to person, place, and time.   Psychiatric:         Mood and Affect: Mood normal.         Behavior: Behavior normal.          EKG  No results found for this or any previous visit (from the past 4464 hours).    Problem List Items Addressed This Visit       Benign essential hypertension    Relevant Orders    ECG 12 Lead    GERD (gastroesophageal reflux disease)    History of immune thrombocytopenia    7/1/25 Platelets 245         Hyperlipidemia    7/1/25 Tchol = 211, HDL = 50, ldl = 146, Trig < 58         Hypotension - Primary    7/1/25 " JOANNE hali, H/H = 12,8/41.7, TSH = 0.16, T4 = 1.0,               Echocardiogram  Follow up with Dr. Andres to address thyroid low TSH  Return 1 month    Cheo Armenta MD          [1]   Past Medical History:  Diagnosis Date    Benign intracranial hypertension     Pseudotumor cerebri    Cervicalgia     Neck pain    Flushing     Hot flashes    Other symptoms and signs involving the musculoskeletal system     Limb weakness    Pain in unspecified joint     Joint pain    Pain in unspecified limb     Limb pain    Personal history of diseases of the blood and blood-forming organs and certain disorders involving the immune mechanism     History of anemia    Personal history of other diseases of the circulatory system     History of cardiac murmur    Personal history of other diseases of the circulatory system     History of varicose veins of lower extremity    Personal history of other diseases of the musculoskeletal system and connective tissue     History of backache    Personal history of other endocrine, nutritional and metabolic disease     History of hypothyroidism    Personal history of other infectious and parasitic diseases     History of chickenpox    Personal history of other specified conditions     History of dizziness    Personal history of other specified conditions     History of palpitations    Personal history of other specified conditions     History of chest pain    Spondylosis without myelopathy or radiculopathy, cervical region     Cervical spondylosis    Stress incontinence (female) (male)     Stress incontinence    Urgency of urination     Urinary urgency   [2]   Past Surgical History:  Procedure Laterality Date    HYSTERECTOMY  01/04/2019    Hysterectomy    KNEE SURGERY  01/04/2019    Knee Surgery    OTHER SURGICAL HISTORY  01/04/2019    Neuroplasty Left Thumb    OTHER SURGICAL HISTORY  01/04/2019    Breast reduction    ROTATOR CUFF REPAIR  01/04/2019    Rotator Cuff Repair    TONSILLECTOMY   09/02/2014    Tonsillectomy   [3]   Family History  Problem Relation Name Age of Onset    Anemia Mother      Atrial fibrillation Mother      Blood Disorder Mother      Hypertension Mother      Skin cancer Mother      Thyroid disease Mother      Stroke Father      Hypertension Father      Kidney disease Father      Melanoma Father      Melanoma Brother     [4]   Current Outpatient Medications   Medication Sig Dispense Refill    acetaZOLAMIDE (Diamox) 250 mg tablet Take 1.5 tablets (375 mg) by mouth 3 times a day. 405 tablet 3    [place] Thyroid 90 mg tablet Take 1 tablet (90 mg) by mouth once daily in the morning. Take before meals. TAKE ON AN EMPTY STOMACH      fluticasone (Cutivate) 0.05 % cream Apply topically 2 times a day.  APPLY TO AFFECTED AREAS AROUND EARS TWICE A DAY FOR 2 WEEKS PER MONTH. REPEAT AS NEEDED.      metroNIDAZOLE (Metrogel) 1 % gel Apply topically once daily.  APPLY TO AFFECTED AREAS TOPICALLY ONCE A DAY       No current facility-administered medications for this visit.

## 2025-07-09 ENCOUNTER — HOSPITAL ENCOUNTER (OUTPATIENT)
Dept: CARDIOLOGY | Facility: HOSPITAL | Age: 70
Discharge: HOME | End: 2025-07-09
Payer: MEDICARE

## 2025-07-09 ENCOUNTER — APPOINTMENT (OUTPATIENT)
Dept: CARDIOLOGY | Facility: CLINIC | Age: 70
End: 2025-07-09
Payer: MEDICARE

## 2025-07-09 DIAGNOSIS — I95.9 HYPOTENSION, UNSPECIFIED HYPOTENSION TYPE: ICD-10-CM

## 2025-07-09 DIAGNOSIS — I95.89 OTHER HYPOTENSION: ICD-10-CM

## 2025-07-09 LAB
AORTIC VALVE MEAN GRADIENT: 4 MMHG
AORTIC VALVE PEAK VELOCITY: 1.45 M/S
AV PEAK GRADIENT: 8 MMHG
AVA (PEAK VEL): 1.87 CM2
AVA (VTI): 1.73 CM2
EJECTION FRACTION APICAL 4 CHAMBER: 70
EJECTION FRACTION: 68 %
LEFT ATRIUM VOLUME AREA LENGTH INDEX BSA: 34.6 ML/M2
LEFT VENTRICLE INTERNAL DIMENSION DIASTOLE: 4.4 CM (ref 3.5–6)
LEFT VENTRICULAR OUTFLOW TRACT DIAMETER: 1.9 CM
MITRAL VALVE E/A RATIO: 1.26
RIGHT VENTRICLE FREE WALL PEAK S': 14.6 CM/S
RIGHT VENTRICLE PEAK SYSTOLIC PRESSURE: 26 MMHG
TRICUSPID ANNULAR PLANE SYSTOLIC EXCURSION: 2.3 CM

## 2025-07-09 PROCEDURE — 93306 TTE W/DOPPLER COMPLETE: CPT

## 2025-07-09 PROCEDURE — 93306 TTE W/DOPPLER COMPLETE: CPT | Performed by: INTERNAL MEDICINE

## 2025-07-11 PROBLEM — C80.1 ADENOCARCINOMA (MULTI): Status: ACTIVE | Noted: 2025-02-19

## 2025-07-11 PROBLEM — M25.50 ARTHRALGIA: Status: RESOLVED | Noted: 2024-07-02 | Resolved: 2025-07-11

## 2025-07-11 PROBLEM — M19.90 ARTHRITIS: Status: RESOLVED | Noted: 2023-11-12 | Resolved: 2025-07-11

## 2025-07-11 PROBLEM — E66.812 CLASS 2 OBESITY WITH BODY MASS INDEX (BMI) OF 39.0 TO 39.9 IN ADULT: Status: RESOLVED | Noted: 2023-11-12 | Resolved: 2025-07-11

## 2025-07-30 ENCOUNTER — APPOINTMENT (OUTPATIENT)
Dept: CARDIOLOGY | Facility: CLINIC | Age: 70
End: 2025-07-30
Payer: MEDICARE

## 2025-07-30 VITALS
WEIGHT: 196 LBS | HEIGHT: 68 IN | DIASTOLIC BLOOD PRESSURE: 82 MMHG | SYSTOLIC BLOOD PRESSURE: 142 MMHG | BODY MASS INDEX: 29.7 KG/M2 | HEART RATE: 58 BPM

## 2025-07-30 DIAGNOSIS — G93.2 PSEUDOTUMOR CEREBRI: ICD-10-CM

## 2025-07-30 DIAGNOSIS — I10 BENIGN ESSENTIAL HYPERTENSION: ICD-10-CM

## 2025-07-30 DIAGNOSIS — K21.9 GASTROESOPHAGEAL REFLUX DISEASE WITHOUT ESOPHAGITIS: ICD-10-CM

## 2025-07-30 DIAGNOSIS — E78.00 PURE HYPERCHOLESTEROLEMIA: ICD-10-CM

## 2025-07-30 DIAGNOSIS — I10 BENIGN ESSENTIAL HYPERTENSION: Primary | ICD-10-CM

## 2025-07-30 DIAGNOSIS — I95.9 HYPOTENSION, UNSPECIFIED HYPOTENSION TYPE: ICD-10-CM

## 2025-07-30 PROCEDURE — 3078F DIAST BP <80 MM HG: CPT | Performed by: INTERNAL MEDICINE

## 2025-07-30 PROCEDURE — 1159F MED LIST DOCD IN RCRD: CPT | Performed by: INTERNAL MEDICINE

## 2025-07-30 PROCEDURE — 99214 OFFICE O/P EST MOD 30 MIN: CPT | Performed by: INTERNAL MEDICINE

## 2025-07-30 PROCEDURE — 3074F SYST BP LT 130 MM HG: CPT | Performed by: INTERNAL MEDICINE

## 2025-07-30 PROCEDURE — 1160F RVW MEDS BY RX/DR IN RCRD: CPT | Performed by: INTERNAL MEDICINE

## 2025-07-30 PROCEDURE — 3008F BODY MASS INDEX DOCD: CPT | Performed by: INTERNAL MEDICINE

## 2025-07-30 PROCEDURE — 99212 OFFICE O/P EST SF 10 MIN: CPT

## 2025-07-30 NOTE — ASSESSMENT & PLAN NOTE
7/1/25 H/H, TSH, BMP all okay.  7/9/30 echocardiogram LVEF =65-70%,  mild to moderate MR, normal aortic valve, Tr with normal RVSP

## 2025-07-30 NOTE — PROGRESS NOTES
Patient requesting compression stockings script to bring to pharmacy. Script signed by Dr. Armenta and given to patient.

## 2025-07-30 NOTE — PROGRESS NOTES
"  Laura Kim \"Abbie\"  is a 70 y.o. year old female who presents for F/U hypotension.  Feels fine now still getting low BP's and HR.  Thyroid medication was decreased.  Worked out in gym this morning    Blood pressure 142/82, pulse 58, height 1.727 m (5' 8\"), weight 88.9 kg (196 lb).   Codeine, Lisinopril, and Adhesive tape-silicones  Medical History[1]  Surgical History[2]  Family History[3]  @SOC    Current Medications[4]     ROS  Review of Systems   All other systems reviewed and are negative.      Physical Exam  Physical Exam  Constitutional:       Appearance: Normal appearance.   HENT:      Head: Normocephalic and atraumatic.     Cardiovascular:      Rate and Rhythm: Normal rate and regular rhythm.   Pulmonary:      Effort: Pulmonary effort is normal.      Breath sounds: Normal breath sounds.   Abdominal:      Palpations: Abdomen is soft.     Musculoskeletal:      Right lower leg: No edema.      Left lower leg: No edema.     Skin:     General: Skin is warm and dry.     Neurological:      General: No focal deficit present.      Mental Status: She is alert and oriented to person, place, and time.     Psychiatric:         Mood and Affect: Mood normal.         Behavior: Behavior normal.          EKG  Encounter Date: 07/03/25   ECG 12 Lead   Result Value    Ventricular Rate 60    Atrial Rate 60    NH Interval 150    QRS Duration 92    QT Interval 456    QTC Calculation(Bazett) 456    P Axis 64    R Axis -32    T Axis 30    QRS Count 9    Q Onset 208    P Onset 133    P Offset 192    T Offset 436    QTC Fredericia 456    Narrative    Normal sinus rhythm  Left axis deviation  Low voltage QRS  Abnormal ECG  No previous ECGs available  Confirmed by Cheo Armenta ([email or web address]) on 7/3/2025 4:48:12 PM       Problem List Items Addressed This Visit       Benign essential hypertension - Primary    GERD (gastroesophageal reflux disease)    Hyperlipidemia    Pseudotumor cerebri    Hypotension    7/1/25 H/H, TSH, BMP " all okay.  7/9/30 echocardiogram LVEF =65-70%,  mild to moderate MR, normal aortic valve, Tr with normal RVSP              Return 1 year with echocardiogram      Cheo Armenta MD        [1]   Past Medical History:  Diagnosis Date    Arthritis 25 years ago    Benign intracranial hypertension     Pseudotumor cerebri    Cervicalgia     Neck pain    Disease of thyroid gland 15 years ago    Flushing     Hot flashes    Hypertension 15 years ago    Other symptoms and signs involving the musculoskeletal system     Limb weakness    Pain in unspecified joint     Joint pain    Pain in unspecified limb     Limb pain    Personal history of diseases of the blood and blood-forming organs and certain disorders involving the immune mechanism     History of anemia    Personal history of other diseases of the circulatory system     History of cardiac murmur    Personal history of other diseases of the circulatory system     History of varicose veins of lower extremity    Personal history of other diseases of the musculoskeletal system and connective tissue     History of backache    Personal history of other endocrine, nutritional and metabolic disease     History of hypothyroidism    Personal history of other infectious and parasitic diseases     History of chickenpox    Personal history of other specified conditions     History of dizziness    Personal history of other specified conditions     History of palpitations    Personal history of other specified conditions     History of chest pain    Spondylosis without myelopathy or radiculopathy, cervical region     Cervical spondylosis    Stress incontinence (female) (male)     Stress incontinence    Urgency of urination     Urinary urgency   [2]   Past Surgical History:  Procedure Laterality Date    COSMETIC SURGERY  2014    HYSTERECTOMY  01/04/2019    Hysterectomy    KNEE SURGERY  01/04/2019    Knee Surgery    OTHER SURGICAL HISTORY  01/04/2019    Neuroplasty Left Thumb    OTHER  SURGICAL HISTORY  01/04/2019    Breast reduction    ROTATOR CUFF REPAIR  01/04/2019    Rotator Cuff Repair    TONSILLECTOMY  09/02/2014    Tonsillectomy   [3]   Family History  Problem Relation Name Age of Onset    Anemia Mother      Atrial fibrillation Mother      Blood Disorder Mother      Hypertension Mother      Skin cancer Mother      Thyroid disease Mother      Stroke Father      Hypertension Father      Kidney disease Father      Melanoma Father      Melanoma Brother     [4]   Current Outpatient Medications   Medication Sig Dispense Refill    acetaZOLAMIDE (Diamox) 250 mg tablet Take 1.5 tablets (375 mg) by mouth 3 times a day. 405 tablet 3    [place] Thyroid 90 mg tablet Take 1 tablet (90 mg) by mouth once daily in the morning. Take before meals. TAKE ON AN EMPTY STOMACH      fluticasone (Cutivate) 0.05 % cream Apply topically 2 times a day.  APPLY TO AFFECTED AREAS AROUND EARS TWICE A DAY FOR 2 WEEKS PER MONTH. REPEAT AS NEEDED.      metroNIDAZOLE (Metrogel) 1 % gel Apply topically once daily.  APPLY TO AFFECTED AREAS TOPICALLY ONCE A DAY       No current facility-administered medications for this visit.

## 2025-09-04 ENCOUNTER — APPOINTMENT (OUTPATIENT)
Dept: CARDIOLOGY | Facility: CLINIC | Age: 70
End: 2025-09-04
Payer: MEDICARE